# Patient Record
Sex: MALE | Race: OTHER | HISPANIC OR LATINO | URBAN - METROPOLITAN AREA
[De-identification: names, ages, dates, MRNs, and addresses within clinical notes are randomized per-mention and may not be internally consistent; named-entity substitution may affect disease eponyms.]

---

## 2023-06-24 ENCOUNTER — EMERGENCY (EMERGENCY)
Facility: HOSPITAL | Age: 28
LOS: 1 days | Discharge: HOME | End: 2023-06-24
Attending: EMERGENCY MEDICINE | Admitting: EMERGENCY MEDICINE
Payer: MEDICAID

## 2023-06-24 VITALS
DIASTOLIC BLOOD PRESSURE: 81 MMHG | HEART RATE: 60 BPM | TEMPERATURE: 98 F | RESPIRATION RATE: 18 BRPM | SYSTOLIC BLOOD PRESSURE: 122 MMHG | OXYGEN SATURATION: 99 %

## 2023-06-24 VITALS
TEMPERATURE: 98 F | OXYGEN SATURATION: 100 % | RESPIRATION RATE: 17 BRPM | HEART RATE: 61 BPM | DIASTOLIC BLOOD PRESSURE: 67 MMHG | SYSTOLIC BLOOD PRESSURE: 116 MMHG

## 2023-06-24 PROCEDURE — 93010 ELECTROCARDIOGRAM REPORT: CPT

## 2023-06-24 PROCEDURE — 12001 RPR S/N/AX/GEN/TRNK 2.5CM/<: CPT

## 2023-06-24 PROCEDURE — 99284 EMERGENCY DEPT VISIT MOD MDM: CPT | Mod: 25

## 2023-06-24 RX ORDER — TETANUS TOXOID, REDUCED DIPHTHERIA TOXOID AND ACELLULAR PERTUSSIS VACCINE, ADSORBED 5; 2.5; 8; 8; 2.5 [IU]/.5ML; [IU]/.5ML; UG/.5ML; UG/.5ML; UG/.5ML
0.5 SUSPENSION INTRAMUSCULAR ONCE
Refills: 0 | Status: COMPLETED | OUTPATIENT
Start: 2023-06-24 | End: 2023-06-24

## 2023-06-24 RX ORDER — ACETAMINOPHEN 500 MG
650 TABLET ORAL ONCE
Refills: 0 | Status: COMPLETED | OUTPATIENT
Start: 2023-06-24 | End: 2023-06-24

## 2023-06-24 RX ADMIN — TETANUS TOXOID, REDUCED DIPHTHERIA TOXOID AND ACELLULAR PERTUSSIS VACCINE, ADSORBED 0.5 MILLILITER(S): 5; 2.5; 8; 8; 2.5 SUSPENSION INTRAMUSCULAR at 04:35

## 2023-06-24 RX ADMIN — Medication 650 MILLIGRAM(S): at 04:34

## 2023-06-24 RX ADMIN — Medication 650 MILLIGRAM(S): at 05:36

## 2023-06-24 NOTE — ED PROCEDURE NOTE - ATTENDING CONTRIBUTION TO CARE
I was present in the department during the E/M service provided. I was in the department during the key portions of the service provided. PAULO.

## 2023-06-24 NOTE — ED PROVIDER NOTE - PATIENT PORTAL LINK FT
You can access the FollowMyHealth Patient Portal offered by [facility] by registering at the following website: [email or web address]. By joining [facility]’s FollowMyHealth portal, you will also be able to view your health information using other applications (apps) compatible with our system.
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
## 2023-06-24 NOTE — ED PROVIDER NOTE - ATTENDING CONTRIBUTION TO CARE
Afebrile. Awake and Alert. 1cm superficial laceration to right posterior occiput. No midline CS TTP. Lungs CTA. Heart RRR. Abdomen soft NTND. CN II-XII grossly intact. Moves all extremities without lateralization. Voice normal. Speaking in full sentences.

## 2023-06-24 NOTE — ED PROVIDER NOTE - MDM ORDERS SUBMITTED SELECTION
Received patient during bedside report.  Patient alert and oriented. Patient in no acute distress and in no pain.  Discussed plan of care, patient safety; patient  Verbalized understanding and was asked to call nurse for assistance.  Plan: cont to monitor and to keep patient safe.   Imaging Studies
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
## 2023-06-24 NOTE — ED PROVIDER NOTE - NSFOLLOWUPINSTRUCTIONS_ED_ALL_ED_FT
-- Please follow up with your primary doctor    -- Keep sutures/staples covered & dry for 24 hours.  Afterwards, once a day, please clean the injury gently with unscented soap & water, apply bacitracin, and then re-cover the wound.  -- Return to Emergency Department for suture removal in 7 days.   -- Remember, a wound doesn't become even close to as strong as normal skin until 6 weeks after the injury.  Please take care to avoid any further trauma to the area to allow the wound to heal.  -- Any increased pain, redness, streaking (red lines), drainage from the wound, swelling, fever, chills please return right away to Emergency Department.  -- You were given a copy of the results from any tests performed today in the Emergency Department which have results available.  Show these to your doctor(s).   Some of the tests we sent may not have results yet so please call or have your doctor call the Emergency Department to follow up on all results.  -- Please continue taking your home medications as directed.  Do not use alcohol when taking any medication (especially antibiotics, tylenol or other pain medication) unless you check with the doctor or pharmacist.

## 2023-06-24 NOTE — ED PROVIDER NOTE - PHYSICAL EXAMINATION
General: Patient awake alert NAD.   HEENT: normocephalic, atraumatic, EOMI, MMM   Cardiac: RRR, S1, S2, no murmur.   LUNGS: ctab, nwob, no wheeze, rhonchi, speaking full sentences.     Abdomen: soft NT, ND, no rebound no guarding.   EXT: Moving all extremities, no edema.   Neuro: A&Ox3, no focal neurological deficits, CN 2-12 grossly intact  Skin: warm, dry, no rash. Posterior occiput with .5cm lac (staple), no hematoma

## 2023-06-24 NOTE — ED ADULT NURSE NOTE - NSFALLHARMRISKINTERV_ED_ALL_ED
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
## 2023-06-24 NOTE — ED PROVIDER NOTE - CLINICAL SUMMARY MEDICAL DECISION MAKING FREE TEXT BOX
29yo M no PMH presents to the ER today c/o head injury. Plan to obtain CT head, update tdap, give analgesics, repair lac, reassess
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
